# Patient Record
(demographics unavailable — no encounter records)

---

## 2024-12-06 NOTE — ASSESSMENT
[FreeTextEntry1] : Patient presents with Functional Oral and Mild Pharyngeal Stage Dysphagia. The Oral Stage is characterized by adequate oral containment, adequate chewing for solid, adequate bolus manipulation, adequate tongue motion for anterior to posterior transfer of the bolus for puree/solids; with adequate oral clearance.   The Pharyngeal Stage is characterized by adequate initiation of the pharyngeal swallow, adequate laryngeal elevation, reduced tongue base retraction and reduced pharyngeal constriction. There is trace pharyngeal clearance deficits located in the vallecular/pyriforms for puree/solids/thin liquids post primary swallow. A reswallow improves pharyngeal clearance.  There was No Aspiration observed before, during or after the swallow for puree, solids, thin liquids.   RESULTS:  No Aspiration observed before, during or after the swallow for puree, solids, thin liquids.   Of Note: Patient was given a Puree and Barium Tablet in Anterior Posterior view. An Esophageal Screen was performed. The Barium Tablet was held up in the vallecular recess and eventually cleared through the pharynx/esophagus with an additional 4 ounces of water. This cannot be considered as a full complete evaluation of the esophagus.

## 2024-12-06 NOTE — HISTORY OF PRESENT ILLNESS
[FreeTextEntry1] : Patient arrived to Radiology for an Outpatient Modified Barium Swallow Study. Patient was accompanied by his wife (Marielena). Patient is an 79 y/o male.   Of Note: Patient had an Outpatient Modified Barium Swallow Study completed back on November 9, 2020. Medical History: Per charting, the patient's medical history is significant for: AAA, CAD, Glaucoma, Hernia, High Cholesterol, Hyperlipidemia, Hypertension, Stent 2001 2003, Vertigo, Nasal Allergies, Sinus problems, High Blood Pressure. As per H&P 11/9/2020 - Patient arrives to today's study accompanied by his spouse who aided in providing information on patient's medical history and dysphagia state. Patient reports dysphagia symptoms characterized by sensation of solids (i.e. lettuce) "getting caught" and "going the wrong way". Patient and spouse report episode of "choking" during whole medication consumption, though deny use of Heimlich maneuver with eventual resolution of sensation over several minutes. Patient reports consuming regular solids and thin liquids and denies dysphagia symptoms/ coughing with thin liquids. Patient denies recent unintentional weight loss, recent pneumonia, and significant neurological history. Patient known to this service from the outpatient department. Initial Modified Barium Swallow Study completed on 11/2/2018, at which time regular solids and thin liquids was recommended as well as Swallow Therapy to maximize swallow function (See EMR for details). Last Swallow Therapy appointment was 12/19/2018 (See EMR for details). Recommendations on 11/9/2020 - 1.Continue with current diet regimen of Regular Solids and Thin Liquids (See Report for details).   Today, Patient offers c/o "sometimes when I eat, I cough, feels like it get stuck"  Patient's wife reports "he cough before, during and after he eats" "a lot of mucous, that what he tells me that why he coughs"  Patient's wife reports giving Heimlich maneuver "one time 3 years ago" with no dislodgement of food from throat and Patient reporting no difficulty breathing at the time.  No current/repeated pneumonia. Patient eats Regular with Thin Liquids.  This Modified Barium Swallow Study is to objectively assess the Physiology of the Oral and Pharyngeal stage swallowing mechanism for treatment plan for least restrictive diet.    Referring MD: Dr. Jeff Myers Fax: [phone]

## 2024-12-06 NOTE — PLAN
[FreeTextEntry2] :  1.) Continue with current diet regime of Regular and Thin Liquids 2.) Feeding/Swallowing Guidelines: Upright position, small bites, chew well, single cup sip; two swallows per bite/sip 3.) Aspiration Precautions 4.) Reflux Precautions 5.) Maintain Good Oral Hygiene Car 6.) Follow up with Physician 7.) Swallow Therapy to maximize swallow mechanism  SLP provided Patient/Wife education regarding preliminary results. Both verbalized understanding and will follow up with Physician.